# Patient Record
Sex: FEMALE | Race: WHITE | NOT HISPANIC OR LATINO | URBAN - METROPOLITAN AREA
[De-identification: names, ages, dates, MRNs, and addresses within clinical notes are randomized per-mention and may not be internally consistent; named-entity substitution may affect disease eponyms.]

---

## 2023-10-06 PROBLEM — I10 HYPERTENSION: Status: ACTIVE | Noted: 2023-10-06

## 2023-10-06 PROBLEM — K21.9 GASTROESOPHAGEAL REFLUX DISEASE WITHOUT ESOPHAGITIS: Status: ACTIVE | Noted: 2023-10-06

## 2023-10-06 PROBLEM — K57.92 DIVERTICULITIS: Status: ACTIVE | Noted: 2023-10-06

## 2023-10-06 PROBLEM — G45.9 TIA (TRANSIENT ISCHEMIC ATTACK): Status: ACTIVE | Noted: 2023-10-06

## 2023-10-06 PROBLEM — D84.89 PRIMARY IMMUNE DEFICIENCY DISORDER: Status: ACTIVE | Noted: 2023-10-06

## 2023-10-06 PROBLEM — J30.9 ALLERGIC RHINITIS: Status: ACTIVE | Noted: 2023-10-06

## 2023-10-06 PROBLEM — M79.7 FIBROMYALGIA: Status: ACTIVE | Noted: 2023-10-06

## 2023-10-06 PROBLEM — E78.49 OTHER HYPERLIPIDEMIA: Status: ACTIVE | Noted: 2023-10-06

## 2023-10-06 PROBLEM — D89.40 MAST CELL ACTIVATION: Status: ACTIVE | Noted: 2023-10-06

## 2023-10-06 PROBLEM — E03.8 OTHER SPECIFIED HYPOTHYROIDISM: Status: ACTIVE | Noted: 2023-10-06

## 2023-10-06 PROBLEM — D69.6 THROMBOCYTOPENIA: Status: ACTIVE | Noted: 2023-10-06

## 2023-10-06 PROBLEM — G47.31 CENTRAL SLEEP APNEA: Status: ACTIVE | Noted: 2023-10-06

## 2023-10-13 ENCOUNTER — OFFICE VISIT (OUTPATIENT)
Dept: OPHTHALMOLOGY | Facility: CLINIC | Age: 73
End: 2023-10-13
Payer: MEDICARE

## 2023-10-13 DIAGNOSIS — H26.492 PCO (POSTERIOR CAPSULAR OPACIFICATION), LEFT: Primary | ICD-10-CM

## 2023-10-13 DIAGNOSIS — Z96.1 PSEUDOPHAKIA: ICD-10-CM

## 2023-10-13 DIAGNOSIS — H43.393 VITREOUS FLOATERS OF BOTH EYES: ICD-10-CM

## 2023-10-13 PROCEDURE — 99213 OFFICE O/P EST LOW 20 MIN: CPT | Mod: PBBFAC,PO | Performed by: STUDENT IN AN ORGANIZED HEALTH CARE EDUCATION/TRAINING PROGRAM

## 2023-10-13 PROCEDURE — 92014 COMPRE OPH EXAM EST PT 1/>: CPT | Mod: S$PBB,,, | Performed by: STUDENT IN AN ORGANIZED HEALTH CARE EDUCATION/TRAINING PROGRAM

## 2023-10-13 PROCEDURE — 92014 PR EYE EXAM, EST PATIENT,COMPREHESV: ICD-10-PCS | Mod: S$PBB,,, | Performed by: STUDENT IN AN ORGANIZED HEALTH CARE EDUCATION/TRAINING PROGRAM

## 2023-10-13 PROCEDURE — 99999 PR PBB SHADOW E&M-EST. PATIENT-LVL III: CPT | Mod: PBBFAC,,, | Performed by: STUDENT IN AN ORGANIZED HEALTH CARE EDUCATION/TRAINING PROGRAM

## 2023-10-13 PROCEDURE — 99999 PR PBB SHADOW E&M-EST. PATIENT-LVL III: ICD-10-PCS | Mod: PBBFAC,,, | Performed by: STUDENT IN AN ORGANIZED HEALTH CARE EDUCATION/TRAINING PROGRAM

## 2023-10-13 NOTE — PROGRESS NOTES
HPI    Pt in today with complaints of floaters in her left eye. Pt states 2-3   weeks ago she lifted a box and saw green stringy lines in her left eye. Pt   states she saw her eye dr and was told gel detached from her eye, but her   vision was still the same. Pt states she just moved and needs a new   prescription for glasses.     NP  PCIOL OU  Dry Eye OU  Laser Treatment in OD due to TIA    Last edited by Saba Damon on 10/13/2023 10:47 AM.            Assessment /Plan     For exam results, see Encounter Report.    PCO (posterior capsular opacification), left    Vitreous floaters of both eyes  -     Ambulatory referral/consult to Ophthalmology    Pseudophakia      Rtc 2 months dilate OS

## 2023-12-07 PROBLEM — R74.8 ELEVATED LIVER ENZYMES: Status: ACTIVE | Noted: 2023-12-07

## 2024-01-08 PROBLEM — G45.9 TIA (TRANSIENT ISCHEMIC ATTACK): Status: RESOLVED | Noted: 2023-10-06 | Resolved: 2024-01-08

## 2024-02-26 PROBLEM — D69.3 ACUTE ITP: Status: ACTIVE | Noted: 2023-10-06

## 2024-09-06 ENCOUNTER — HOSPITAL ENCOUNTER (OUTPATIENT)
Dept: RADIOLOGY | Facility: HOSPITAL | Age: 74
Discharge: HOME | End: 2024-09-06
Attending: FAMILY MEDICINE
Payer: MEDICARE

## 2024-09-06 DIAGNOSIS — I10 HYPERTENSION, UNSPECIFIED TYPE: ICD-10-CM

## 2024-09-06 DIAGNOSIS — Z01.810 PREOP CARDIOVASCULAR EXAM: ICD-10-CM

## 2024-09-06 DIAGNOSIS — D69.3 ACUTE ITP: ICD-10-CM

## 2024-09-06 DIAGNOSIS — I67.1 BRAIN ANEURYSM: ICD-10-CM

## 2024-09-06 PROCEDURE — 71046 X-RAY EXAM CHEST 2 VIEWS: CPT | Mod: TC,PO

## 2024-09-06 PROCEDURE — 71046 X-RAY EXAM CHEST 2 VIEWS: CPT | Mod: 26,,, | Performed by: RADIOLOGY

## 2025-01-17 ENCOUNTER — TELEPHONE (OUTPATIENT)
Dept: OPHTHALMOLOGY | Facility: CLINIC | Age: 75
End: 2025-01-17
Payer: MEDICARE

## 2025-01-17 NOTE — TELEPHONE ENCOUNTER
Returned call to patient, she was in the hospital she had a TIA and wanted to know if there was anything sooner for her to be seen, I was not able to find a sooner appt but I did make sure she was on the cancellation list .        ----- Message from Fátima sent at 1/17/2025  1:27 PM CST -----  Pt had appt for today she had to cancel due to being in hospital pt would like to be worked back in schedule next week states she need hospital f/u and changes in vision, different glasses for working on computer she would like to speak with [facility] staff .[phone]

## 2025-02-14 ENCOUNTER — OFFICE VISIT (OUTPATIENT)
Dept: OPHTHALMOLOGY | Facility: CLINIC | Age: 75
End: 2025-02-14
Payer: MEDICARE

## 2025-02-14 DIAGNOSIS — H26.493 POSTERIOR CAPSULAR OPACIFICATION, BOTH EYES: Primary | ICD-10-CM

## 2025-02-14 DIAGNOSIS — Z96.1 PSEUDOPHAKIA: ICD-10-CM

## 2025-02-14 PROCEDURE — 99999 PR PBB SHADOW E&M-EST. PATIENT-LVL III: CPT | Mod: PBBFAC,,, | Performed by: STUDENT IN AN ORGANIZED HEALTH CARE EDUCATION/TRAINING PROGRAM

## 2025-02-14 PROCEDURE — 99213 OFFICE O/P EST LOW 20 MIN: CPT | Mod: PBBFAC,PO | Performed by: STUDENT IN AN ORGANIZED HEALTH CARE EDUCATION/TRAINING PROGRAM

## 2025-02-14 NOTE — PROGRESS NOTES
HPI     Annual Exam     Additional comments: Pt states she has been having blurry vision OD, left   eye is okay. No pain, but she has been seeing floaters OS. Wants 2   different sets of glasses, for near & distance  Lab Results       Component                Value               Date                       HGBA1C                   5.8                 01/12/2025                      Comments    PCIOL OU  Dry Eye OU  Laser Treatment in OD due to TIA          Last edited by Lm Rico on 2/14/2025  9:14 AM.            Assessment /Plan     For exam results, see Encounter Report.    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 Yag laser treatment OU needed next aval.     Pseudophakia- Follow      RTC next aval. YAG OU

## 2025-03-07 ENCOUNTER — OFFICE VISIT (OUTPATIENT)
Dept: OPHTHALMOLOGY | Facility: CLINIC | Age: 75
End: 2025-03-07
Payer: MEDICARE

## 2025-03-07 DIAGNOSIS — H26.493 POSTERIOR CAPSULAR OPACIFICATION, BOTH EYES: Primary | ICD-10-CM

## 2025-03-07 PROBLEM — E66.01 SEVERE OBESITY (BMI 35.0-39.9) WITH COMORBIDITY: Status: ACTIVE | Noted: 2025-03-07

## 2025-03-07 PROBLEM — R73.03 PREDIABETES: Status: ACTIVE | Noted: 2025-03-07

## 2025-03-07 PROCEDURE — 99999 PR PBB SHADOW E&M-EST. PATIENT-LVL III: CPT | Mod: PBBFAC,,, | Performed by: STUDENT IN AN ORGANIZED HEALTH CARE EDUCATION/TRAINING PROGRAM

## 2025-03-07 PROCEDURE — 99213 OFFICE O/P EST LOW 20 MIN: CPT | Mod: PBBFAC | Performed by: STUDENT IN AN ORGANIZED HEALTH CARE EDUCATION/TRAINING PROGRAM

## 2025-03-07 NOTE — PROGRESS NOTES
HPI     Procedure            Comments: YAG OU          Comments    1. PCIOL OU  2. Dry Eye OU  3. Brain surgery/ Laser Treatment in OD due to TIA 12/202          Last edited by Lm Rico on 3/7/2025  2:02 PM.            Assessment /Plan     For exam results, see Encounter Report.    Posterior capsular opacification, both eyes- Yag Operative Procedure Note    75 y.o. year old patient experiencing a symptomatic decrease in vision OU with inability to perform activities of daily living including reading.      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 Including risks of retinal detachment (1-3%), macular edema, dislocated implant, pain, inflammation elevated intraocular pressure and vision loss. Consent signed.  Time out procedure form completed prior to laser.    Medications given:  Tetracaine  Right eye-  Laser energy settings:    4 energy per shot  16 bursts  1 to 1 ratio per shot     Central capsular opening created without difficulty.    Medications given:  Tetracaine  Left eye-  Laser energy settings:    6 energy per shot  80 bursts  1 to 1 ratio per shot     Central capsular opening created without difficulty.    RTC 4 weeks for DFE and Mrx

## 2025-04-11 ENCOUNTER — OFFICE VISIT (OUTPATIENT)
Dept: OPHTHALMOLOGY | Facility: CLINIC | Age: 75
End: 2025-04-11
Payer: MEDICARE

## 2025-04-11 DIAGNOSIS — H43.393 VITREOUS FLOATERS OF BOTH EYES: ICD-10-CM

## 2025-04-11 DIAGNOSIS — H02.834 DERMATOCHALASIS OF BOTH UPPER EYELIDS: ICD-10-CM

## 2025-04-11 DIAGNOSIS — Z96.1 PSEUDOPHAKIA: Primary | ICD-10-CM

## 2025-04-11 DIAGNOSIS — H02.831 DERMATOCHALASIS OF BOTH UPPER EYELIDS: ICD-10-CM

## 2025-04-11 PROCEDURE — 99999 PR PBB SHADOW E&M-EST. PATIENT-LVL III: CPT | Mod: PBBFAC,,, | Performed by: STUDENT IN AN ORGANIZED HEALTH CARE EDUCATION/TRAINING PROGRAM

## 2025-04-11 PROCEDURE — 99213 OFFICE O/P EST LOW 20 MIN: CPT | Mod: PBBFAC,PO | Performed by: STUDENT IN AN ORGANIZED HEALTH CARE EDUCATION/TRAINING PROGRAM

## 2025-04-11 NOTE — PROGRESS NOTES
HPI     Follow-up     Additional comments: Pt reports for 4 week dilated exam. Pt says her   vision has gotten worse in the last 4 weeks. No pain or irritation. Pt   sees floaters in both eyes. Pt says she is seeing yellow and green lights   in her left eye. No other ocular complaints.            Comments    1. PCIOL OU  2. Dry Eye OU  3. Brain surgery/ Laser Treatment in OD due to TIA 12/2024             Last edited by Lilian Cam on 4/11/2025  9:43 AM.            Assessment /Plan     For exam results, see Encounter Report.    Pseudophakia- Stable,   MRx dispensed    Vitreous floaters of both eyes- No tears or breaks were seen after careful retinal evaluation.   RT/RD precautions    Discussed retinal detachment signs and symptoms including flashes of lights, floaters, perceived curtains or veils. Advised to patient to monitor visual status including increase in flashes and floaters, or the development of visual field changes including curtain and /or veils. Advised patient to RTC urgently if these symptoms occur. Explained the need for follow up exams to the patient even if there are no changes in the symptoms.        RTC 1 year DFE or PRN with optometry to establish care

## 2025-04-15 ENCOUNTER — PATIENT MESSAGE (OUTPATIENT)
Dept: NEUROLOGY | Facility: CLINIC | Age: 75
End: 2025-04-15
Payer: MEDICARE

## 2025-08-04 ENCOUNTER — HOSPITAL ENCOUNTER (OUTPATIENT)
Dept: RADIOLOGY | Facility: HOSPITAL | Age: 75
Discharge: HOME | End: 2025-08-04
Attending: NURSE PRACTITIONER
Payer: MEDICARE

## 2025-08-04 DIAGNOSIS — M54.50 CHRONIC MIDLINE LOW BACK PAIN WITHOUT SCIATICA: ICD-10-CM

## 2025-08-04 DIAGNOSIS — G89.29 CHRONIC MIDLINE LOW BACK PAIN WITHOUT SCIATICA: ICD-10-CM

## 2025-08-04 PROBLEM — G47.21 DELAYED SLEEP PHASE SYNDROME: Status: ACTIVE | Noted: 2024-09-04

## 2025-08-04 PROBLEM — M54.16 LUMBAR RADICULOPATHY: Status: ACTIVE | Noted: 2024-07-12

## 2025-08-04 PROBLEM — M51.360 DEGENERATION OF INTERVERTEBRAL DISC OF LUMBAR REGION WITH DISCOGENIC BACK PAIN: Status: ACTIVE | Noted: 2025-08-04

## 2025-08-04 PROBLEM — D36.10 SCHWANNOMA: Status: ACTIVE | Noted: 2024-07-12

## 2025-08-04 PROBLEM — M19.90 ARTHRITIS: Status: ACTIVE | Noted: 2025-08-04

## 2025-08-04 PROBLEM — E78.00 PURE HYPERCHOLESTEROLEMIA: Status: ACTIVE | Noted: 2025-08-04

## 2025-08-04 PROBLEM — D69.3 CHRONIC ITP (IDIOPATHIC THROMBOCYTOPENIA): Status: ACTIVE | Noted: 2025-08-04

## 2025-08-04 PROBLEM — F41.9 ANXIETY DISORDER: Status: ACTIVE | Noted: 2025-08-04

## 2025-08-04 PROBLEM — Z86.0100 HISTORY OF COLONIC POLYPS: Status: ACTIVE | Noted: 2025-08-04

## 2025-08-04 PROBLEM — K30 FUNCTIONAL DYSPEPSIA: Status: ACTIVE | Noted: 2025-08-04

## 2025-08-04 PROBLEM — D64.9 NORMOCHROMIC NORMOCYTIC ANEMIA: Status: ACTIVE | Noted: 2025-06-18

## 2025-08-04 PROBLEM — Z66 DNR (DO NOT RESUSCITATE): Status: ACTIVE | Noted: 2025-01-11

## 2025-08-04 PROBLEM — K59.00 CONSTIPATION: Status: ACTIVE | Noted: 2025-08-04

## 2025-08-04 PROBLEM — I67.1 CEREBRAL ARTERIAL ANEURYSM: Status: ACTIVE | Noted: 2024-07-12

## 2025-08-04 PROBLEM — K57.32 SIGMOID DIVERTICULITIS: Status: ACTIVE | Noted: 2025-08-04

## 2025-08-04 PROBLEM — J45.909 ASTHMA: Status: ACTIVE | Noted: 2025-08-04

## 2025-08-04 PROBLEM — K76.0 METABOLIC DYSFUNCTION-ASSOCIATED STEATOTIC LIVER DISEASE (MASLD): Status: ACTIVE | Noted: 2025-08-04

## 2025-08-04 PROBLEM — N80.9 ENDOMETRIOSIS: Status: ACTIVE | Noted: 2025-08-04

## 2025-08-04 PROBLEM — Z86.73 HISTORY OF TRANSIENT ISCHEMIC ATTACK (TIA): Status: ACTIVE | Noted: 2024-09-04

## 2025-08-04 PROCEDURE — 72110 X-RAY EXAM L-2 SPINE 4/>VWS: CPT | Mod: TC,PO

## 2025-08-04 PROCEDURE — 72110 X-RAY EXAM L-2 SPINE 4/>VWS: CPT | Mod: 26,,, | Performed by: RADIOLOGY
